# Patient Record
Sex: FEMALE | Race: BLACK OR AFRICAN AMERICAN | Employment: FULL TIME | URBAN - METROPOLITAN AREA
[De-identification: names, ages, dates, MRNs, and addresses within clinical notes are randomized per-mention and may not be internally consistent; named-entity substitution may affect disease eponyms.]

---

## 2020-11-11 ENCOUNTER — HOSPITAL ENCOUNTER (OUTPATIENT)
Age: 50
Discharge: HOME | End: 2020-11-11
Payer: COMMERCIAL

## 2020-11-11 VITALS
TEMPERATURE: 98 F | DIASTOLIC BLOOD PRESSURE: 75 MMHG | SYSTOLIC BLOOD PRESSURE: 127 MMHG | BODY MASS INDEX: 21.71 KG/M2 | WEIGHT: 115 LBS | RESPIRATION RATE: 18 BRPM | OXYGEN SATURATION: 98 % | HEART RATE: 112 BPM | HEIGHT: 61 IN

## 2020-11-11 DIAGNOSIS — Z20.822 ENCOUNTER FOR LABORATORY TESTING FOR COVID-19 VIRUS: Primary | ICD-10-CM

## 2020-11-11 PROCEDURE — 99202 OFFICE O/P NEW SF 15 MIN: CPT | Performed by: EMERGENCY MEDICINE

## 2020-11-11 NOTE — ED PROVIDER NOTES
Patient Seen in: [facility]      History   Patient presents with:  Testing    Stated Complaint: Testing    Linda Guajardo is a 44year old  female here for Covid test after exposure. No sx  No change in taste or smell.   No fever, chills, body range of motion. Normal range of motion. No erythema, neck rigidity, pain with movement, spinous process tenderness or muscular tenderness. Cardiovascular:      Rate and Rhythm: Normal rate. Pulses: Normal pulses.    Pulmonary:      Effort: Pulmonary hydration, and otc measures.                      Disposition and Plan     Clinical Impression:  Encounter for laboratory testing for COVID-19 virus  (primary encounter diagnosis)    Disposition:  Discharge  11/11/2020  5:50 pm    Follow-up:  No follow-up p

## 2020-11-11 NOTE — ED INITIAL ASSESSMENT (HPI)
Pt here for covid testing,pt found out one of her coworkers testing +covid today,pt denies any symptom at this time